# Patient Record
Sex: FEMALE | Race: BLACK OR AFRICAN AMERICAN | Employment: FULL TIME | URBAN - METROPOLITAN AREA
[De-identification: names, ages, dates, MRNs, and addresses within clinical notes are randomized per-mention and may not be internally consistent; named-entity substitution may affect disease eponyms.]

---

## 2020-08-22 ENCOUNTER — HOSPITAL ENCOUNTER (EMERGENCY)
Age: 25
Discharge: HOME | End: 2020-08-22
Attending: EMERGENCY MEDICINE
Payer: MEDICAID

## 2020-08-22 VITALS
HEART RATE: 50 BPM | DIASTOLIC BLOOD PRESSURE: 76 MMHG | HEIGHT: 67 IN | RESPIRATION RATE: 15 BRPM | SYSTOLIC BLOOD PRESSURE: 123 MMHG | OXYGEN SATURATION: 100 % | WEIGHT: 217 LBS | TEMPERATURE: 98 F | BODY MASS INDEX: 34.06 KG/M2

## 2020-08-22 DIAGNOSIS — H60.391 ACUTE INFECTIVE OTITIS EXTERNA, RIGHT: Primary | ICD-10-CM

## 2020-08-22 PROCEDURE — 99282 EMERGENCY DEPT VISIT SF MDM: CPT

## 2020-08-22 RX ORDER — CIPROFLOXACIN AND DEXAMETHASONE 3; 1 MG/ML; MG/ML
4 SUSPENSION/ DROPS AURICULAR (OTIC) 2 TIMES DAILY
Qty: 7.5 ML | Refills: 0 | Status: SHIPPED | OUTPATIENT
Start: 2020-08-22 | End: 2020-08-29

## 2020-08-22 NOTE — ED NOTES
Patient here with c/o ear pain. Patient reports pain and drainage in right ear x5 days. Patient denies fevers. Patient reports being able to hear from ear but states diminished. Patient denies putting anything into her ear other than gauze or cotton balls. Emergency Department Nursing Plan of Care       The Nursing Plan of Care is developed from the Nursing assessment and Emergency Department Attending provider initial evaluation. The plan of care may be reviewed in the ED Provider note.     The Plan of Care was developed with the following considerations:   Patient / Family readiness to learn indicated by:verbalized understanding  Persons(s) to be included in education: patient  Barriers to Learning/Limitations:No    Signed     Prakash Snowden RN    8/22/2020   9:43 AM

## 2020-08-22 NOTE — ED PROVIDER NOTES
EMERGENCY DEPARTMENT HISTORY AND PHYSICAL EXAM      Date: 8/22/2020  Patient Name: [facility] Karime  Patient Age and Sex: 27 y.o. female    History of Presenting Illness     Chief Complaint   Patient presents with    Ear Pain     Right ear x5 days, drainage and fullness       History Provided By: Patient    Ability to gather history was limited by:     HPI: Kyra Lundborg, 27 y.o. female complains of drainage from and mild discomfort in the right ear starting 5 days ago. Mild pressure sensation and fullness, slighly muffled hearing. No left-sided symptoms. No other nasal or oral pharyngeal symptoms. No headache. No recent swimming. Location:    Quality:      Severity:    Duration:   Timing:      Context:    Modifying factors:   Associated symptoms:       The patient's medical, surgical, family, and social history on file were reviewed by me today. Past Medical History:   Diagnosis Date    Other ill-defined conditions(719.89)     vit d deficiency     History reviewed. No pertinent surgical history. PCP: Shadi Snowden MD    Past History     Past Medical History:  Past Medical History:   Diagnosis Date    Other ill-defined conditions(609.89)     vit d deficiency       Past Surgical History:  History reviewed. No pertinent surgical history. Family History:  History reviewed. No pertinent family history. Social History:  Social History     Tobacco Use    Smoking status: Never Smoker    Smokeless tobacco: Never Used   Substance Use Topics    Alcohol use: Yes     Comment: socially    Drug use: No       Allergies:  No Known Allergies    Current Medications:  No current facility-administered medications on file prior to encounter. Current Outpatient Medications on File Prior to Encounter   Medication Sig Dispense Refill    fluticasone (FLONASE) 50 mcg/actuation nasal spray 2 Sprays by Both Nostrils route daily.  1 Bottle 0       Review of Systems   Review of Systems   Constitutional: Negative for fever. HENT: Positive for ear discharge and ear pain. Negative for sinus pressure, sinus pain and trouble swallowing. Neurological: Negative for headaches. All other systems reviewed and are negative. Physical Exam   Vital Signs  Patient Vitals for the past 8 hrs:   Temp Pulse Resp BP SpO2   08/22/20 0904 98 °F (36.7 °C) (!) 50 15 123/76 100 %          Physical Exam  Vitals signs and nursing note reviewed. Constitutional:       General: She is not in acute distress. Appearance: She is not ill-appearing. HENT:      Right Ear: No decreased hearing noted. Drainage present. No tenderness. No middle ear effusion. No foreign body. Tympanic membrane is not erythematous or bulging. Ears:      Comments: Drainage and thick white debris in right ear canal  Neck:      Musculoskeletal: Full passive range of motion without pain, normal range of motion and neck supple. Normal range of motion. Skin:     General: Skin is warm and dry. Findings: No lesion or rash. Neurological:      General: No focal deficit present. Mental Status: She is alert and oriented to person, place, and time. Cranial Nerves: Cranial nerves are intact. No cranial nerve deficit or facial asymmetry. Diagnostic Study Results   Labs  No results found for this or any previous visit (from the past 24 hour(s)). Radiologic Studies  No orders to display     CT Results  (Last 48 hours)    None        CXR Results  (Last 48 hours)    None          Procedures   Procedures    Medical Decision Making     I reviewed the patient's most recent Emergency Dept notes and diagnostic tests  in formulating my MDM on today's visit. Provider Notes (Medical Decision Making):   79-year-old female with right ear discharge and discomfort, fullness and muffled hearing, for the past week. On exam she has some thick white discharge in the right ear canal, normal TM. No neurologic findings.     Uncomplicated otitis externa of the right ear, treat with Ciprodex. Miky Yeh MD  9:24 AM        Social History     Tobacco Use    Smoking status: Never Smoker    Smokeless tobacco: Never Used   Substance Use Topics    Alcohol use: Yes     Comment: socially    Drug use: No     Patient Vitals for the past 4 hrs:   Temp Pulse Resp BP SpO2   08/22/20 0904 98 °F (36.7 °C) (!) 50 15 123/76 100 %            Consults:      Medications Administered during ED course:  Medications - No data to display       Current Discharge Medication List      START taking these medications    Details   ciprofloxacin-dexamethasone (Ciprodex) 0.3-0.1 % otic suspension Administer 4 Drops in right ear two (2) times a day for 7 days. Qty: 7.5 mL, Refills: 0                Diagnosis and Disposition     Disposition:  Discharged    Clinical Impression:   1. Acute infective otitis externa, right        Attestation:  I personally performed the services described in this documentation on this date 8/22/2020 for patient Brandy Schaffer. Miky Yeh MD        I was the first provider for this patient on this visit. To the best of my ability I reviewed relevant prior medical records, electrocardiograms, laboratories, and radiologic studies. The patient's presenting problems were discussed, and the patient was in agreement with the care plan formulated and outlined with them. Miky Yeh MD    Please note that this dictation was completed with Dragon voice recognition software. Quite often unanticipated grammatical, syntax, homophones, and other interpretive errors are inadvertently transcribed by the computer software. Please disregard these errors and excuse any errors that have escaped final proofreading.

## 2023-05-12 RX ORDER — FLUTICASONE PROPIONATE 50 MCG
2 SPRAY, SUSPENSION (ML) NASAL DAILY
COMMUNITY
Start: 2016-04-24

## 2024-04-02 ENCOUNTER — OFFICE VISIT (OUTPATIENT)
Age: 29
End: 2024-04-02

## 2024-04-02 VITALS — HEIGHT: 67 IN | WEIGHT: 188 LBS | BODY MASS INDEX: 29.51 KG/M2

## 2024-04-02 DIAGNOSIS — G89.29 CHRONIC PATELLOFEMORAL PAIN OF RIGHT KNEE: Primary | ICD-10-CM

## 2024-04-02 DIAGNOSIS — M25.561 CHRONIC PATELLOFEMORAL PAIN OF RIGHT KNEE: Primary | ICD-10-CM

## 2024-04-02 NOTE — PROGRESS NOTES
Activity: Not on file   Stress: Not on file   Social Connections: Not on file   Intimate Partner Violence: Not on file   Housing Stability: Not on file       REVIEW OF SYSTEMS:      Negative except for that stated above.     [unfilled]      Prescription medication management discussed with patient.     Electronically signed by: Malini Dallas PA-C    Note: This note was completed using voice recognition software.  Any typographical/name errors or mistakes are unintentional.

## 2024-04-03 RX ORDER — FAMOTIDINE 20 MG/1
20 TABLET, FILM COATED ORAL 2 TIMES DAILY
Qty: 60 TABLET | Refills: 3 | Status: SHIPPED | OUTPATIENT
Start: 2024-04-03

## 2024-04-03 RX ORDER — DICLOFENAC SODIUM 75 MG/1
75 TABLET, DELAYED RELEASE ORAL 2 TIMES DAILY
Qty: 60 TABLET | Refills: 1 | Status: SHIPPED | OUTPATIENT
Start: 2024-04-03 | End: 2024-06-02

## 2024-04-15 ENCOUNTER — HOSPITAL ENCOUNTER (OUTPATIENT)
Facility: HOSPITAL | Age: 29
Setting detail: RECURRING SERIES
Discharge: HOME | End: 2024-04-18
Payer: MEDICAID

## 2024-04-15 PROCEDURE — 97161 PT EVAL LOW COMPLEX 20 MIN: CPT

## 2024-04-15 NOTE — PROGRESS NOTES
[facility]  [address] [place] [address]  Phone: [phone]   [phone]  Plan of Care / Statement of Necessity for Physical Therapy Services     Patient Name: Sharonda Sanford : 1995   Medical   Diagnosis: Pain in right knee [M25.561] Treatment Diagnosis:   Pain in right knee [M25.561]    Onset Date: 2019     Referral Source: Malini Dallas PA-C Start of Care (SOC): 4/15/2024   Prior Hospitalization: See medical history Provider #: [id number]   Prior Level of Function: No limitations prior to onset. Gym 2 x/wk; TM walking, light weight lifting   Comorbidities: Unremarkable per pt report     Assessment / key information:  Pt is a 28 y.o. year old female with subjective complaints of R knee pain.  ART: Pt reports chronic R knee pain off/on for 7 years, but more persistent over the past year since starting work as a  for [employer]; pushing down on the brake. Pt seen by ortho at [place] with referral for PT and given prescription for patellar maltracking knee brace for use with work (though pt states she is unaware of this order and advised pt to f/u with MD office). Pt states she has had an MRI and xray recently, but unsure of results. Pt denies buckling.     XRAY Knee as per Ortho note:  2024:  4 view x-ray of the right knee including AP, notch, lateral, and sunrise demonstrates well-preserved joint space with a lateral riding patella with lateral tilt on sunrise view.  No evidence of deformity of the trochlear groove.    Current pain is rated as 5 to 8/10; and described as \"achy/throbbing\"; intermittent.  Current functional limitations:  driving, sitting with knee bent for prolonged periods of time, popping with STS transfers, stairs (worse ascending), squats  FOTO score= 66/100 indicating 34% impairment to functional activities.    Today's evaulation is significant for:  Fall Risk Assessment: Does the patient

## 2024-04-15 NOTE — PROGRESS NOTES
PHYSICAL / OCCUPATIONAL THERAPY - DAILY TREATMENT NOTE (updated )  For Eval visit    Patient Name: Sharonda Sanford    Date: 4/15/2024    : 1995  Insurance: Payor: [facility] PL / Plan: [facility] [place] / Product Type: *No Product type* /      Patient  verified yes     Visit #   Current / Total 1 16   Time   In / Out 2:20 2:53   Pain   In / Out 7 7   Subjective Functional Status/Changes: See POC     TREATMENT AREA =  see POC    OBJECTIVE       30 min   Eval - untimed                      Therapeutic Procedures:    Tx Min Billable or 1:1 Min (if diff from Tx Min) Procedure, Rationale, Specifics   3  49256 Self Care/Home Management (timed):  improve patient knowledge and understanding of posture/ergonomics, diagnosis/prognosis, and physical therapy expectations, procedures and progression  to improve patient's ability to progress to PLOF and address remaining functional goals.  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          Details if applicable:            Details if applicable:            Details if applicable:            Details if applicable:     3  [facility] Totals Reminder: bill using total billable min of TIMED therapeutic procedures (example: do not include dry needle or estim unattended, both untimed codes, in totals to left)  8-22 min = 1 unit; 23-37 min = 2 units; 38-52 min = 3 units; 53-67 min = 4 units; 68-82 min = 5 units   Total Total     [x]  Patient Education billed concurrently with other procedures   [x] Review HEP    [] Progressed/Changed HEP, detail:    [] Other detail:       Objective Information/Functional Measures/Assessment    See POC    Patient will continue to benefit from skilled PT / OT services to modify and progress therapeutic interventions, analyze and address functional mobility deficits, analyze and address ROM

## 2024-04-16 ENCOUNTER — HOSPITAL ENCOUNTER (OUTPATIENT)
Facility: HOSPITAL | Age: 29
Setting detail: RECURRING SERIES
Discharge: HOME | End: 2024-04-19
Payer: MEDICAID

## 2024-04-16 PROCEDURE — 97530 THERAPEUTIC ACTIVITIES: CPT

## 2024-04-16 PROCEDURE — 97112 NEUROMUSCULAR REEDUCATION: CPT

## 2024-04-16 PROCEDURE — 97110 THERAPEUTIC EXERCISES: CPT

## 2024-04-16 NOTE — PROGRESS NOTES
PHYSICAL / OCCUPATIONAL THERAPY - DAILY TREATMENT NOTE    Patient Name: Sharonda Sanford    Date: 2024    : 1995  Insurance: Payor: [facility] PL / Plan: [facility] [place] / Product Type: *No Product type* /      Patient  verified Yes     Visit #   Current / Total 2 16   Time   In / Out 3:40 4:33   Pain   In / Out 6 6   Subjective Functional Status/Changes: Pt states she has difficulty doing her HEP correctly (QS with VMO emphasis seated)     TREATMENT AREA =  Pain in right knee [M25.561]    OBJECTIVE    Modalities Rationale:     decrease inflammation and decrease pain to improve patient's ability to progress to PLOF and address remaining functional goals.     min [] Estim Unattended, type/location:                                      []  w/ice    []  w/heat    min [] Estim Attended, type/location:                                     []  w/US     []  w/ice    []  w/heat    []  TENS insruct      min []  Mechanical Traction: type/lbs                   []  pro   []  sup   []  int   []  cont    []  before manual    []  after manual    min []  Ultrasound, settings/location:     10 min  unbill [x]  Ice     []  Heat    location/position: R knee in reclined long sitting    min []  Paraffin,  details:     min []  Vasopneumatic Device, press/temp:     min []  Whirlpool / Fluido:    If using vaso (only need to measure limb vaso being performed on)      pre-treatment girth :       post-treatment girth :       measured at (landmark location) :      min []  Other:    Skin assessment post-treatment:   Intact      Therapeutic Procedures:    Tx Min Billable or 1:1 Min (if diff from Tx Min) Procedure, Rationale, Specifics   15  92462 Therapeutic Exercise (timed):  increase ROM, strength, coordination, balance, and proprioception to improve patient's ability to progress to PLOF and address remaining functional goals. (see flow sheet as applicable)     Details if applicable:     21 [id number]

## 2024-04-23 ENCOUNTER — APPOINTMENT (OUTPATIENT)
Facility: HOSPITAL | Age: 29
End: 2024-04-23
Payer: MEDICAID

## 2024-04-24 ENCOUNTER — APPOINTMENT (OUTPATIENT)
Facility: HOSPITAL | Age: 29
End: 2024-04-24
Payer: MEDICAID

## 2024-04-29 NOTE — PROGRESS NOTES
PHYSICAL / OCCUPATIONAL THERAPY - DAILY TREATMENT NOTE    Patient Name: Sharonda Sanford    Date: 2024    : 1995  Insurance: Payor: [facility] PL / Plan: [facility] [place] OF [place] / Product Type: *No Product type* /      Patient  verified Yes     Visit #   Current / Total 3 16   Time   In / Out 3:08 3:54   Pain   In / Out 7 5   Subjective Functional Status/Changes: Pt states she did OK after her last session; no significant changes to knee pain since SOC. Has been too busy to do her HEP consistently. She saw MD  and was advised to continue with PT. Pt has not heard anything about a brace.      TREATMENT AREA =  Pain in right knee [M25.561]    OBJECTIVE    Modalities Rationale:     decrease inflammation and decrease pain to improve patient's ability to progress to PLOF and address remaining functional goals.     min [] Estim Unattended, type/location:                                      []  w/ice    []  w/heat    min [] Estim Attended, type/location:                                     []  w/US     []  w/ice    []  w/heat    []  TENS insruct      min []  Mechanical Traction: type/lbs                   []  pro   []  sup   []  int   []  cont    []  before manual    []  after manual    min []  Ultrasound, settings/location:     10 min  unbill [x]  Ice     []  Heat    location/position: R knee in reclined long sitting    min []  Paraffin,  details:     min []  Vasopneumatic Device, press/temp:     min []  Whirlpool / Fluido:    If using vaso (only need to measure limb vaso being performed on)      pre-treatment girth :       post-treatment girth :       measured at (landmark location) :      min []  Other:    Skin assessment post-treatment:   Intact      Therapeutic Procedures:    Tx Min Billable or 1:1 Min (if diff from Tx Min) Procedure, Rationale, Specifics   10  [id number] Therapeutic Exercise (timed):  increase ROM, strength, coordination, balance, and proprioception to improve

## 2024-04-30 ENCOUNTER — HOSPITAL ENCOUNTER (OUTPATIENT)
Facility: HOSPITAL | Age: 29
Setting detail: RECURRING SERIES
Discharge: HOME | End: 2024-05-03
Payer: MEDICAID

## 2024-04-30 PROCEDURE — 97530 THERAPEUTIC ACTIVITIES: CPT

## 2024-04-30 PROCEDURE — 97110 THERAPEUTIC EXERCISES: CPT

## 2024-04-30 PROCEDURE — 97112 NEUROMUSCULAR REEDUCATION: CPT

## 2024-05-01 ENCOUNTER — APPOINTMENT (OUTPATIENT)
Facility: HOSPITAL | Age: 29
End: 2024-05-01
Payer: MEDICAID

## 2024-05-06 ENCOUNTER — APPOINTMENT (OUTPATIENT)
Facility: HOSPITAL | Age: 29
End: 2024-05-06
Payer: MEDICAID

## 2024-05-07 ENCOUNTER — HOSPITAL ENCOUNTER (OUTPATIENT)
Facility: HOSPITAL | Age: 29
Setting detail: RECURRING SERIES
Discharge: HOME | End: 2024-05-10
Payer: MEDICAID

## 2024-05-07 PROCEDURE — 97110 THERAPEUTIC EXERCISES: CPT

## 2024-05-07 PROCEDURE — 97535 SELF CARE MNGMENT TRAINING: CPT

## 2024-05-07 PROCEDURE — 97112 NEUROMUSCULAR REEDUCATION: CPT

## 2024-05-07 NOTE — PROGRESS NOTES
therapeutic procedures (example: do not include dry needle or estim unattended, both untimed codes, in totals to left)  8-22 min = 1 unit; 23-37 min = 2 units; 38-52 min = 3 units; 53-67 min = 4 units; 68-82 min = 5 units   Total Total     [x]  Patient Education billed concurrently with other procedures   [x] Review HEP    [] Progressed/Changed HEP, detail:    [] Other detail:       Objective Information/Functional Measures/Assessment  MMT: Right hip ABD 3+/5 before TPR, 4-/5 post TPR   Reported improvement in pain post session today. Tenderness/Tightness noted and improved with TPR to the left QL and right hip adductors (more tender with adductors than QL). Improvement in right glute med strength noted after this TPR. Limited B quad set is noted as well in supine. She reported having difficulty with tarun her right glutes/piriformis with clams when compared to the left, which may contribute to her strength limitations and pain. Pt due for a reassessment NV.     Patient will continue to benefit from skilled PT / OT services to modify and progress therapeutic interventions, analyze and address functional mobility deficits, analyze and address ROM deficits, analyze and address strength deficits, analyze and address soft tissue restrictions, analyze and cue for proper movement patterns, analyze and modify for postural abnormalities, and instruct in home and community integration to address functional deficits and attain remaining goals.    Progress toward goals / Updated goals:  []  See Progress Note/Recertification  Short Term Goals: To be accomplished in 2 WEEKS  1.  Pt will be educated in/compliant with appropriate HEP to decrease pain, increase ROM, increase strength and return pt to PLOF.    Status at last note/certification: issued at [facility]  Current:   4/16: Goal in progress updated today  2. Pt will improve R quad/ham strength by 1/3 MMT for improved tolerance with functional mobility.  Status at last

## 2024-05-13 ENCOUNTER — APPOINTMENT (OUTPATIENT)
Facility: HOSPITAL | Age: 29
End: 2024-05-13
Payer: MEDICAID

## 2024-05-14 ENCOUNTER — APPOINTMENT (OUTPATIENT)
Facility: HOSPITAL | Age: 29
End: 2024-05-14
Payer: MEDICAID

## 2024-05-23 ENCOUNTER — HOSPITAL ENCOUNTER (OUTPATIENT)
Facility: HOSPITAL | Age: 29
Setting detail: RECURRING SERIES
Discharge: HOME | End: 2024-05-26
Payer: MEDICAID

## 2024-05-23 PROCEDURE — 97110 THERAPEUTIC EXERCISES: CPT

## 2024-05-23 PROCEDURE — 97530 THERAPEUTIC ACTIVITIES: CPT

## 2024-05-23 NOTE — PROGRESS NOTES
[facility]  [address] [place] [address]  Phone: [phone]   [phone]  PHYSICAL THERAPY PROGRESS NOTE  Patient Name: Sharonda Sanford : 1995   Treatment/Medical Diagnosis: Pain in right knee [M25.561]   Referral Source: Malini Dallas PA-C     Date of Initial Visit: 4/15/24 Attended Visits: 5 Missed Visits: 0     SUMMARY OF TREATMENT  Physical therapy has consisted of therapeutic exercises for increased LE strength, ROM, and flexibility. Therapeutic activities performed to improve functional activities, model real life movements and performance specific to the patient's need with supervision to return the patient to their PLOF.  Neuromuscular Re-ed performed to improve coordination, improve mm activation, improve proprioception to improve the patient's ability to perform ADL/IADL's.  Manual therapy PRN for decreased muscle hypertonicity and increased ROM/flexibility. Cold pack provided PRN for palliative.  Pt education provided regarding HEP.     CURRENT STATUS  % improvement: 40%  Max pain 5/10 (with driving and braking); localized to luh-medial knee but also travels to luh-lateral; \"achy\"  Avg pain 4/10  Min pain 3/10    Current improvements:  reports decreased pain level. No longer painful to ascend stairs  Remaining functional limitations:  driving, sitting with knee bent for prolonged periods of time, popping with STS transfers, squats    Objective measures:  FUNCTIONAL ASSESSMENT:    Squats ~70 deg knee flexion with luh-medial R knee pain   Single leg Heel Raise:  R 10 reps (partial ROM), L 18 reps   Stairs: ascends/descends reciprocally no pain.     Bridge 25%   Quad Set: R Good, L n/a   SLR R 10 reps (ext lag >7 reps)    AROM:  HIP Ext (in s/l) R 3 deg, L 10    STRENGTH:  HIP  Flex R 4-/5, L 4-/5.  EXT  R 4/5, L 4+/5.  ABD R 3+/5, L 4/5. Add R 3/5   KNEE:  Ext R 3+/5, L 5/5.  Flex R 5/5, L 5/5    SPECIAL

## 2024-05-23 NOTE — PROGRESS NOTES
PHYSICAL / OCCUPATIONAL THERAPY - DAILY TREATMENT NOTE    Patient Name: Sharonda Sanford    Date: 2024    : 1995  Insurance: Payor: [facility] PL / Plan: [facility] [place] / Product Type: *No Product type* /      Patient  verified Yes     Visit #   Current / Total 5 16   Time   In / Out 1:04 1:44   Pain   In / Out 5 4   Subjective Functional Status/Changes: See PN     TREATMENT AREA =  Pain in right knee [M25.561]    OBJECTIVE  Therapeutic Procedures:    Tx Min Billable or 1:1 Min (if diff from Tx Min) Procedure, Rationale, Specifics   18  15435 Therapeutic Exercise (timed):  increase ROM, strength, coordination, balance, and proprioception to improve patient's ability to progress to PLOF and address remaining functional goals. (see flow sheet as applicable)     Details if applicable:     22  83838 Therapeutic Activity (timed):  use of dynamic activities replicating functional movements to increase ROM, strength, coordination, balance, and proprioception in order to improve patient's ability to progress to PLOF and address remaining functional goals.  (see flow sheet as applicable)     Details if applicable:    -including thorough reassessment for PN   x  15888 Neuromuscular Re-Education (timed):  improve balance, coordination, kinesthetic sense, posture, core stability and proprioception to improve patient's ability to develop conscious control of individual muscles and awareness of position of extremities in order to progress to PLOF and address remaining functional goals. (see flow sheet as applicable)     Details if applicable:       40  [facility] Totals Reminder: bill using total billable min of TIMED therapeutic procedures (example: do not include dry needle or estim unattended, both untimed codes, in totals to left)  8-22 min = 1 unit; 23-37 min = 2 units; 38-52 min = 3 units; 53-67 min = 4 units; 68-82 min = 5 units   Total Total     [x]  Patient Education billed

## 2024-05-30 ENCOUNTER — HOSPITAL ENCOUNTER (OUTPATIENT)
Facility: HOSPITAL | Age: 29
Setting detail: RECURRING SERIES
End: 2024-05-30
Payer: MEDICAID

## 2024-05-30 PROCEDURE — 97110 THERAPEUTIC EXERCISES: CPT

## 2024-05-30 PROCEDURE — 97112 NEUROMUSCULAR REEDUCATION: CPT

## 2024-05-30 PROCEDURE — 97535 SELF CARE MNGMENT TRAINING: CPT

## 2024-05-30 PROCEDURE — 97530 THERAPEUTIC ACTIVITIES: CPT

## 2024-05-30 NOTE — PROGRESS NOTES
PHYSICAL / OCCUPATIONAL THERAPY - DAILY TREATMENT NOTE    Patient Name: Sharonda Sanford    Date: 2024    : 1995  Insurance: Payor: [facility] PL / Plan: [facility] OF [place] / Product Type: *No Product type* /      Patient  verified Yes     Visit #   Current / Total 6 9   Time   In / Out 700 756   Pain   In / Out 0/10 0/10   Subjective Functional Status/Changes: Pt reports no knee pain today. 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     TREATMENT AREA =  Pain in right knee [M25.561]     OBJECTIVE    Modalities Rationale:     decrease inflammation to improve patient's ability to progress to PLOF and address remaining functional goals.     min [] Estim Unattended, type/location:                                      []  w/ice    []  w/heat    min [] Estim Attended, type/location:                                     []  w/US     []  w/ice    []  w/heat    []  TENS insruct      min []  Mechanical Traction: type/lbs                   []  pro   []  sup   []  int   []  cont    []  before manual    []  after manual    min []  Ultrasound, settings/location:     10 min  unbill [x]  Ice     []  Heat    location/position: R knee in reclined long sitting     min []  Paraffin,  details:     min []  Vasopneumatic Device, press/temp:     min []  Whirlpool / Fluido:    If using vaso (only need to measure limb vaso being performed on)      pre-treatment girth :       post-treatment girth :       measured at (landmark location) :      min []  Other:    Skin assessment post-treatment:   Intact      Therapeutic Procedures:    Tx Min Billable or 1:1 Min (if diff from Tx Min) Procedure, Rationale, Specifics   20  11826 Therapeutic Exercise (timed):  increase ROM, strength, coordination, balance, and proprioception to improve patient's ability to progress to PLOF and address remaining functional goals. (see flow sheet as applicable)     Details if applicable:

## 2024-06-11 NOTE — PROGRESS NOTES
PHYSICAL / OCCUPATIONAL THERAPY - DAILY TREATMENT NOTE    Patient Name: Sharonda Sanford    Date: 2024    : 1995  Insurance: Payor: [facility] PL / Plan: [facility] [place] / Product Type: *No Product type* /      Patient  verified Yes     Visit #   Current / Total 7 9   Time   In / Out [id number]   Pain   In / Out 4/10 0/10   Subjective Functional Status/Changes: Pt states knee has been feeling better and reports decreased pain.     TREATMENT AREA =  Pain in right knee [M25.561]     OBJECTIVE    Modalities Rationale:     decrease inflammation to improve patient's ability to progress to PLOF and address remaining functional goals.     min [] Estim Unattended, type/location:                                      []  w/ice    []  w/heat    min [] Estim Attended, type/location:                                     []  w/US     []  w/ice    []  w/heat    []  TENS insruct      min []  Mechanical Traction: type/lbs                   []  pro   []  sup   []  int   []  cont    []  before manual    []  after manual    min []  Ultrasound, settings/location:     10 min  unbill [x]  Ice     []  Heat    location/position: R knee in reclined long sitting     min []  Paraffin,  details:     min []  Vasopneumatic Device, press/temp:     min []  Whirlpool / Fluido:    If using vaso (only need to measure limb vaso being performed on)      pre-treatment girth :       post-treatment girth :       measured at (landmark location) :      min []  Other:    Skin assessment post-treatment:   Intact      Therapeutic Procedures:    Tx Min Billable or 1:1 Min (if diff from Tx Min) Procedure, Rationale, Specifics     72255 Therapeutic Exercise (timed):  increase ROM, strength, coordination, balance, and proprioception to improve patient's ability to progress to PLOF and address remaining functional goals. (see flow sheet as applicable)     Details if applicable:       10  17650 Therapeutic Activity

## 2024-06-12 ENCOUNTER — HOSPITAL ENCOUNTER (OUTPATIENT)
Facility: HOSPITAL | Age: 29
Setting detail: RECURRING SERIES
Discharge: HOME | End: 2024-06-15
Payer: MEDICAID

## 2024-06-12 PROCEDURE — 97110 THERAPEUTIC EXERCISES: CPT

## 2024-06-12 PROCEDURE — 97530 THERAPEUTIC ACTIVITIES: CPT

## 2024-06-12 PROCEDURE — 97112 NEUROMUSCULAR REEDUCATION: CPT

## 2024-06-19 NOTE — PROGRESS NOTES
PHYSICAL / OCCUPATIONAL THERAPY - DAILY TREATMENT NOTE    Patient Name: Sharonda Sanford    Date: 2024    : 1995  Insurance: Payor: [facility] PL / Plan: [facility] [place] / Product Type: *No Product type* /      Patient  verified Yes     Visit #   Current / Total 8 9   Time   In / Out 3:00 3:40   Pain   In / Out 3 0   Subjective Functional Status/Changes: See PN     TREATMENT AREA =  Pain in right knee [M25.561]     OBJECTIVE    Modalities Rationale:     decrease inflammation to improve patient's ability to progress to PLOF and address remaining functional goals.     min [] Estim Unattended, type/location:                                      []  w/ice    []  w/heat    min [] Estim Attended, type/location:                                     []  w/US     []  w/ice    []  w/heat    []  TENS insruct      min []  Mechanical Traction: type/lbs                   []  pro   []  sup   []  int   []  cont    []  before manual    []  after manual    min []  Ultrasound, settings/location:     10 min  unbill [x]  Ice     []  Heat    location/position: R knee in reclined long sitting; extra 1 layer towel for comfort     min []  Paraffin,  details:     min []  Vasopneumatic Device, press/temp:     min []  Whirlpool / Fluido:    If using vaso (only need to measure limb vaso being performed on)      pre-treatment girth :       post-treatment girth :       measured at (landmark location) :      min []  Other:    Skin assessment post-treatment:   Intact      Therapeutic Procedures:    Tx Min Billable or 1:1 Min (if diff from Tx Min) Procedure, Rationale, Specifics   10  51956 Therapeutic Exercise (timed):  increase ROM, strength, coordination, balance, and proprioception to improve patient's ability to progress to PLOF and address remaining functional goals. (see flow sheet as applicable)     Details if applicable:         15021 Therapeutic Activity (timed):  use of dynamic activities

## 2024-06-19 NOTE — THERAPY DISCHARGE
range).  Flex 4+/5    GROC +5      LONG TERM GOALS: to be achieved in __4__ WEEKS  1. Pt will improve FOTO score to >/= 80 to demo a significant improvement in functional activity tolerance.  Status at last note/certification: :  72  Current:  : Goal progressin  2. 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  Status at last note/certification: :  0% improvement  Current:   : Goal not met; 20% improvement  24: 40%  3. Pt will increase R Quad strength to at least 4+/5 for improved tolerance with stair negotiation.  Status at last note/certification: :   Quad 3+/5  Current:   : Goal Met; Ext 5-/5 (pain when tested in mid range)  4. Pt will increase R glute med/max strength to at least 4+/5 for improved tolerance with functional mobility  Status at last note/certification: :  EXT R 4/5. ABD R 3+/5  Current:   : Goal Met for glute max, not met glute med:   ABD 4/5. Ext 5/5.      Payor: [facility] PL / Plan: [facility]UNITY PLAN OF [facility] / Product Type: *No Product type* /     Non-Medicare, can change goals, can adjust or add frequency duration, no signature required      New Goals to be achieved in __4__ WEEKS  1. Pt will improve FOTO score to >/= 80 to demo a significant improvement in functional activity tolerance.  Status at last note/certification: :  77  Current:    2. 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  Status at last note/certification:  :  20% improvement  Current:    3. Pt will increase R Quad strength to at least 4+/5 for improved tolerance with stair negotiation.  Status at last note/certification: :  Ext 5-/5 (pain when tested in mid range)  Current:    4. Patient will demonstrate good eccentric quad control in lateral step down off 6 inch step with no compensation to facilitate normalized gait pattern for

## 2024-06-20 ENCOUNTER — HOSPITAL ENCOUNTER (OUTPATIENT)
Facility: HOSPITAL | Age: 29
Setting detail: RECURRING SERIES
Discharge: HOME | End: 2024-06-23
Payer: MEDICAID

## 2024-06-20 PROCEDURE — 97110 THERAPEUTIC EXERCISES: CPT

## 2024-06-20 PROCEDURE — 97530 THERAPEUTIC ACTIVITIES: CPT

## 2024-06-20 NOTE — THERAPY RECERTIFICATION
Flex 4+/5    GROC +5      LONG TERM GOALS: to be achieved in __4__ WEEKS  1. Pt will improve FOTO score to >/= 80 to demo a significant improvement in functional activity tolerance.  Status at last note/certification: :  72  Current:  : Goal progressin  2. 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  Status at last note/certification: :  0% improvement  Current:   : Goal not met; 20% improvement  24: 40%  3. Pt will increase R Quad strength to at least 4+/5 for improved tolerance with stair negotiation.  Status at last note/certification: :   Quad 3+/5  Current:   : Goal Met; Ext 5-/5 (pain when tested in mid range)  4. Pt will increase R glute med/max strength to at least 4+/5 for improved tolerance with functional mobility  Status at last note/certification: :  EXT R 4/5. ABD R 3+/5  Current:   : Goal Met for glute max, not met glute med:   ABD 4/5. Ext 5/5.      Payor: [facility] PL / Plan: [facility] COMMUNITY PLAN OF [place] / Product Type: *No Product type* /     Non-Medicare, can change goals, can adjust or add frequency duration, no signature required      New Goals to be achieved in __4__ WEEKS  1. Pt will improve FOTO score to >/= 80 to demo a significant improvement in functional activity tolerance.  Status at last note/certification: :  77  Current:    2. 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  Status at last note/certification:  :  20% improvement  Current:    3. Pt will increase R Quad strength to at least 4+/5 for improved tolerance with stair negotiation.  Status at last note/certification: :  Ext 5-/5 (pain when tested in mid range)  Current:    4. Patient will demonstrate good eccentric quad control in lateral step down off 6 inch step with no compensation to facilitate normalized gait pattern for stair